# Patient Record
Sex: FEMALE | Race: BLACK OR AFRICAN AMERICAN | NOT HISPANIC OR LATINO | Employment: FULL TIME | URBAN - METROPOLITAN AREA
[De-identification: names, ages, dates, MRNs, and addresses within clinical notes are randomized per-mention and may not be internally consistent; named-entity substitution may affect disease eponyms.]

---

## 2023-04-19 PROBLEM — Z98.51 HISTORY OF BILATERAL TUBAL LIGATION: Status: ACTIVE | Noted: 2023-04-19

## 2023-04-19 PROBLEM — D25.2 SUBSEROUS LEIOMYOMA OF UTERUS: Status: ACTIVE | Noted: 2023-04-19

## 2023-04-19 PROBLEM — N92.1 MENORRHAGIA WITH IRREGULAR CYCLE: Status: ACTIVE | Noted: 2023-04-19

## 2023-04-19 PROBLEM — Z88.0 PENICILLIN ALLERGY: Status: ACTIVE | Noted: 2023-04-19

## 2023-04-19 PROBLEM — J45.909 ASTHMA: Status: ACTIVE | Noted: 2023-04-19

## 2023-04-19 PROBLEM — Z86.19 HISTORY OF TRICHOMONAL VAGINITIS: Status: ACTIVE | Noted: 2023-04-19

## 2023-04-19 PROBLEM — Z98.891 HISTORY OF CESAREAN SECTION: Status: ACTIVE | Noted: 2023-04-19

## 2023-05-09 PROBLEM — Z90.710 S/P LAPAROSCOPIC ASSISTED VAGINAL HYSTERECTOMY (LAVH): Status: ACTIVE | Noted: 2023-05-09

## 2023-05-10 ENCOUNTER — SOCIAL WORK (OUTPATIENT)
Dept: ADMINISTRATIVE | Facility: OTHER | Age: 38
End: 2023-05-10

## 2023-05-10 NOTE — PROGRESS NOTES
SW received pt's FMLA paperwork from pt's boyfriend. SW place paperwork in folder for the clinic nurse to complete. No other needs identified at this time.    Brittany Murray,MSW  Pager#[phone]

## 2023-07-31 ENCOUNTER — PATIENT MESSAGE (OUTPATIENT)
Dept: RESEARCH | Facility: HOSPITAL | Age: 38
End: 2023-07-31